# Patient Record
Sex: MALE | Race: WHITE | NOT HISPANIC OR LATINO | Employment: FULL TIME | URBAN - METROPOLITAN AREA
[De-identification: names, ages, dates, MRNs, and addresses within clinical notes are randomized per-mention and may not be internally consistent; named-entity substitution may affect disease eponyms.]

---

## 2024-07-03 ENCOUNTER — OFFICE VISIT (OUTPATIENT)
Dept: URGENT CARE | Facility: CLINIC | Age: 39
End: 2024-07-03
Payer: COMMERCIAL

## 2024-07-03 VITALS
RESPIRATION RATE: 20 BRPM | DIASTOLIC BLOOD PRESSURE: 86 MMHG | HEIGHT: 70 IN | SYSTOLIC BLOOD PRESSURE: 130 MMHG | WEIGHT: 195 LBS | BODY MASS INDEX: 27.92 KG/M2 | TEMPERATURE: 98 F | OXYGEN SATURATION: 99 % | HEART RATE: 84 BPM

## 2024-07-03 DIAGNOSIS — S70.362A TICK BITE OF LEFT THIGH, INITIAL ENCOUNTER: Primary | ICD-10-CM

## 2024-07-03 DIAGNOSIS — Z76.89 ENCOUNTER TO ESTABLISH CARE: ICD-10-CM
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
## 2024-07-03 RX ORDER — DULOXETIN HYDROCHLORIDE 30 MG/1
30 CAPSULE, DELAYED RELEASE ORAL
COMMUNITY
Start: 2024-04-25

## 2024-07-03 RX ORDER — GABAPENTIN 100 MG/1
100 CAPSULE ORAL
COMMUNITY
Start: 2024-04-15

## 2024-07-03 RX ORDER — DOXYCYCLINE 100 MG/1
100 CAPSULE ORAL 2 TIMES DAILY
Qty: 20 CAPSULE | Refills: 0 | Status: SHIPPED | OUTPATIENT
Start: 2024-07-03 | End: 2024-07-13

## 2024-07-03 RX ORDER — PRAZOSIN HYDROCHLORIDE 1 MG/1
1 CAPSULE ORAL NIGHTLY
COMMUNITY
Start: 2024-04-15

## 2024-07-03 RX ORDER — LAMOTRIGINE 100 MG/1
100 TABLET ORAL
COMMUNITY
Start: 2024-05-26

## 2024-07-03 RX ORDER — NALTREXONE HYDROCHLORIDE 50 MG/1
50 TABLET, FILM COATED ORAL
COMMUNITY
Start: 2024-05-06

## 2024-07-03 NOTE — PROGRESS NOTES
"Subjective:      Patient ID: Chilo Allen is a 39 y.o. male.    Vitals:  height is 5' 10" (1.778 m) and weight is 88.5 kg (195 lb). His temporal temperature is 98.2 °F (36.8 °C). His blood pressure is 130/86 and his pulse is 84. His respiration is 20 and oxygen saturation is 99%.     Chief Complaint: Rash (Have a tick bite that is growing and sore - Entered by patient)    Pt visited [place] 2 weeks ago. He noticed the tick to left inner thigh 1 week ago and removed it.  Patient was showering and thought it was a scabbed.  When pulling on the scab he realize it was a tick. Warm too touch, swelling, painful, and spreading as of 1 week.     Rash  This is a new problem. The current episode started in the past 7 days. The problem has been gradually worsening since onset. The affected locations include the left upper leg. The rash is characterized by pain, redness, swelling and bruising. He was exposed to an insect bite/sting. Pertinent negatives include no fatigue, fever or shortness of breath. Past treatments include nothing.       Constitution: Negative for chills, fatigue and fever.   Respiratory:  Negative for shortness of breath.    Skin:  Positive for rash and erythema.      Objective:     Physical Exam   Constitutional: He is oriented to person, place, and time. He appears well-developed.   HENT:   Head: Normocephalic and atraumatic. Head is without abrasion, without contusion and without laceration.   Ears:   Right Ear: External ear normal.   Left Ear: External ear normal.   Nose: Nose normal.   Mouth/Throat: Oropharynx is clear and moist and mucous membranes are normal.   Eyes: Conjunctivae, EOM and lids are normal. Pupils are equal, round, and reactive to light.   Neck: Trachea normal and phonation normal. Neck supple.   Cardiovascular: Normal rate, regular rhythm and normal heart sounds.   Pulmonary/Chest: Effort normal and breath sounds normal. No stridor. No respiratory distress. "   Musculoskeletal: Normal range of motion.         General: Normal range of motion.   Neurological: He is alert and oriented to person, place, and time.   Skin: Skin is warm, dry, intact and no rash. Capillary refill takes less than 2 seconds. erythema No abrasion, No burn, No bruising and No ecchymosis        Psychiatric: His speech is normal and behavior is normal. Judgment and thought content normal.   Nursing note and vitals reviewed.      Assessment:     1. Tick bite of left thigh, initial encounter    2. Encounter to establish care        Plan:       Tick bite of left thigh, initial encounter  -     doxycycline (VIBRAMYCIN) 100 MG Cap; Take 1 capsule (100 mg total) by mouth 2 (two) times daily. for 10 days  Dispense: 20 capsule; Refill: 0    Encounter to establish care  -     Ambulatory referral/consult to Family Practice            Discussed results/diagnosis/plan with patient in clinic. Strict precautions given to patient to monitor for worsening signs and symptoms. Advised to follow up with PCP or specialist.  Explained side effects of medications prescribed with patient and informed him/her to discontinue use if he/she has any side effects and to inform UC or PCP if this occurs. All questions answered. Strict ED verses clinic return precautions stressed and given in depth. Advised if symptoms worsens of fail to improve he/she should go to the Emergency Room. Discharge and follow-up instructions given verbally/printed with the patient who expressed understanding and willingness to comply with my recommendations. Patient voiced understanding and in agreement with current treatment plan. Patient exits the exam room in no acute distress. Conversant and engaged during discharge discussion, verbalized understanding.

## 2024-07-03 NOTE — PATIENT INSTRUCTIONS
Take oral antibiotic (doxycycline) as directed for the next 10 days.  Take full dose or symptoms will not get better. Take with food and water to decrease GI upset. Try a probiotic or eat daily yogurt to maintain good gut and vaginal piyush while on an antibiotic. Do not drink alcohol while taking an antibiotic.      Alternate tylenol and ibuprofen every 4 hours as needed for pain. Always take ibuprofen with food and water to avoid GI upset. Stop taking if you develop abdominal pain or blood in stool.     Try an oral anti-histamine like zyrtec during the day for itching. Over the counter Pepcid twice per day can be helpful (helps decrease inflammation).  You can try benadryl at night for itching. This will make you drowsy. Make sure not to drive, drink alcohol, operate machinery or take other sedating medications while taking this.      Apply ice packs or wet cloths to the rash to reduce itching. Keep cool and stay out of the sun. Leave rash open to the air as much as you can. Avoid using fragrant soaps as this can cause irritation. Only use gentle soaps. Stay moisturized.     Drink plenty of fluids daily (water is best).   The recommended daily fluid intake for men is 3.7 liters (seven 16 oz bottles).    Cleanse wound once per day with gentle soap and water. Do not use peroxide to cleanse as this will disrupt the natural skin healing process.     Monitor for worsening signs of infection such as redness, warmth, increase in pain, purulent drainage, fevers, chills, body aches.     Eat nutritious foods high in antioxidants that include fruits and vegetables. Try a Mediterranean diet to decrease inflammation and will help boost your immune system.     Getting enough sleep at night (7-8 hours), eating nutritious foods, managing stress levels and maintaining physical activity (3 days a week of 45 min of aerobic activity) can all help your immune system.    Follow up with a PCP next week. A referral was placed for you,  call [phone] to schedule appointment.          ([facility] recommendation)  The Mediterranean diet is based on plant-based foods and healthy fats.     You eat LOTS of vegetables, fruits, beans, lentils, nuts, whole grains (wheat bread, brown rice) & extra virgin olive oil.   ---- These foods are high in fiber and antioxidants.   ---- These nutrients help reduce inflammation.   ---- Fiber helps regulate bowel movements.   ---- Antioxidants protect you against cancer.     Eat a moderate amount of fish (salmon, herring, mackerel, sardines, anchovies, halibut, rainbow trout, tuna)   ---- (fish are high in omega-3 fatty acids)    Eat a moderate amount of cheese and yogurt.    Eat little or no meat-- eat more poultry (baked chicken, grilled chicken, ground turkey)  ---- avoid red meat and pork (causes inflammation and linked to colon cancer)    Eat little or no sweats, sugary drinks or butter.     Limit your sodium intake. A diet high in salt can increase your blood pressure and predisposes you to a heart attack or stroke.     BENEFITS OF DIET  --- Lowers your risk of cardiovascular disease (heart attack, stroke) and many other diseases.   --- Supports a healthy body weight.   --- Supports a healthy blood sugar, blood pressure and cholesterol.   --- Lowers your risk of metabolic syndrome   --- Supports a heathy balance of good gut bacteria in your digestive system.   --- Lowers your risk for cancer.   --- Helps with brain function and slows decline as we age.   --- Helps you live longer.     Talk to your primary care doctor about a dietician referral for further guidance.

## 2024-07-15 ENCOUNTER — OFFICE VISIT (OUTPATIENT)
Dept: PRIMARY CARE CLINIC | Facility: CLINIC | Age: 39
End: 2024-07-15
Payer: COMMERCIAL

## 2024-07-15 VITALS
HEART RATE: 75 BPM | WEIGHT: 196.19 LBS | DIASTOLIC BLOOD PRESSURE: 76 MMHG | TEMPERATURE: 98 F | RESPIRATION RATE: 16 BRPM | OXYGEN SATURATION: 98 % | HEIGHT: 70 IN | SYSTOLIC BLOOD PRESSURE: 130 MMHG | BODY MASS INDEX: 28.09 KG/M2

## 2024-07-15 DIAGNOSIS — Z76.89 ENCOUNTER TO ESTABLISH CARE: Primary | ICD-10-CM

## 2024-07-15 DIAGNOSIS — K21.9 GASTROESOPHAGEAL REFLUX DISEASE, UNSPECIFIED WHETHER ESOPHAGITIS PRESENT: ICD-10-CM

## 2024-07-15 DIAGNOSIS — Z11.59 NEED FOR HEPATITIS C SCREENING TEST: ICD-10-CM

## 2024-07-15 DIAGNOSIS — K64.8 INTERNAL HEMORRHOID: ICD-10-CM

## 2024-07-15 DIAGNOSIS — Z79.899 OTHER LONG TERM (CURRENT) DRUG THERAPY: ICD-10-CM

## 2024-07-15 DIAGNOSIS — Z29.81 ENCOUNTER FOR PRE-EXPOSURE PROPHYLAXIS FOR HIV: ICD-10-CM

## 2024-07-15 DIAGNOSIS — Z72.0 TOBACCO ABUSE: ICD-10-CM

## 2024-07-15 DIAGNOSIS — Z78.9 ALCOHOL USE: ICD-10-CM

## 2024-07-15 DIAGNOSIS — Z11.4 ENCOUNTER FOR SCREENING FOR HIV: ICD-10-CM

## 2024-07-15 DIAGNOSIS — F31.81 BIPOLAR 2 DISORDER: ICD-10-CM

## 2024-07-15 PROCEDURE — 93005 ELECTROCARDIOGRAM TRACING: CPT | Mod: S$GLB,,, | Performed by: STUDENT IN AN ORGANIZED HEALTH CARE EDUCATION/TRAINING PROGRAM

## 2024-07-15 PROCEDURE — 99999 PR PBB SHADOW E&M-EST. PATIENT-LVL IV: CPT | Mod: PBBFAC,,, | Performed by: STUDENT IN AN ORGANIZED HEALTH CARE EDUCATION/TRAINING PROGRAM

## 2024-07-15 PROCEDURE — 99204 OFFICE O/P NEW MOD 45 MIN: CPT | Mod: S$GLB,,, | Performed by: STUDENT IN AN ORGANIZED HEALTH CARE EDUCATION/TRAINING PROGRAM

## 2024-07-15 PROCEDURE — 3078F DIAST BP <80 MM HG: CPT | Mod: CPTII,S$GLB,, | Performed by: STUDENT IN AN ORGANIZED HEALTH CARE EDUCATION/TRAINING PROGRAM

## 2024-07-15 PROCEDURE — 1159F MED LIST DOCD IN RCRD: CPT | Mod: CPTII,S$GLB,, | Performed by: STUDENT IN AN ORGANIZED HEALTH CARE EDUCATION/TRAINING PROGRAM

## 2024-07-15 PROCEDURE — 1160F RVW MEDS BY RX/DR IN RCRD: CPT | Mod: CPTII,S$GLB,, | Performed by: STUDENT IN AN ORGANIZED HEALTH CARE EDUCATION/TRAINING PROGRAM

## 2024-07-15 PROCEDURE — 3075F SYST BP GE 130 - 139MM HG: CPT | Mod: CPTII,S$GLB,, | Performed by: STUDENT IN AN ORGANIZED HEALTH CARE EDUCATION/TRAINING PROGRAM

## 2024-07-15 PROCEDURE — 3008F BODY MASS INDEX DOCD: CPT | Mod: CPTII,S$GLB,, | Performed by: STUDENT IN AN ORGANIZED HEALTH CARE EDUCATION/TRAINING PROGRAM

## 2024-07-15 PROCEDURE — 93010 ELECTROCARDIOGRAM REPORT: CPT | Mod: S$GLB,,, | Performed by: INTERNAL MEDICINE

## 2024-07-15 RX ORDER — HYDROCORTISONE ACETATE 25 MG/1
25 SUPPOSITORY RECTAL 2 TIMES DAILY PRN
Qty: 24 SUPPOSITORY | Refills: 0 | Status: SHIPPED | OUTPATIENT
Start: 2024-07-15 | End: 2024-07-27

## 2024-07-15 NOTE — PATIENT INSTRUCTIONS
Encounter to establish care  -  CMP, lipid, A1c, TSH and EKG.   -     Bipolar 2 disorder  - CBC, CMP, TSH.  -     EKG 12-lead   - follows w/ psych in town.   - continue with current cares.    Gastroesophageal reflux disease, unspecified whether esophagitis present   - watching diet.  Continue with dietary intervention and adjustments as needed.  If worsening would recommend starting on H2 blocker or PPI daily.    Tobacco abuse  -     EKG 12-lead   - patient interested in smoking cessation, has been working on this previously.  Encouraged to continue endeavor in this direction as cigarette smoking or vaping are detrimental to overall health.    Alcohol use   - has been drinking more heavily since moving to [facility] states is part of his social seen more so than it had been in [facility].   - will check liver function, would benefit from trying to limit consumption closer toward the recommended 2 drinks per day or less on average.    Internal hemorrhoid  -     hydrocortisone (ANUSOL-HC) 25 mg suppository; Place 1 suppository (25 mg total) rectally 2 (two) times daily as needed for Hemorrhoids.  Dispense: 24 suppository; Refill: 0   - having flared internal hemorrhoid at this time.   - had become worse after use of doxycycline.   - discuss use of preparation H, or other OTC meds.  Due to worsening symptom will prescribe Anusol suppositories, can use up to 2 times per day 2 weeks.   - would use caution with any receptive intercourse, avoid if possible.  Use a good lubricant otherwise.     Encounter for pre-exposure prophylaxis for HIV   - patient has previously been on PrEP, would like to resume therapy.  Referral placed to ID clinic.    Need for hepatitis C screening test  -     Hepatitis C Antibody; Future; Expected date: 07/15/2024    Encounter for screening for HIV  -     Ambulatory referral/consult to Infectious Disease; Future; Expected date: 07/22/2024  -     HIV 1/2 Ag/Ab (4th Gen); Future; Expected  date: 07/15/2024    Other long term (current) drug therapy  -  CMP, lipid, A1c, TSH and EKG.   -

## 2024-07-15 NOTE — PROGRESS NOTES
Subjective:           Patient ID: Chilo Allen   Age:  39 y.o.  Sex: male     Chief Complaint:   Establish Care and Tick Removal (Tick bite)      History of Present Illness:    Chilo Allen is a 39 y.o. male who presents today with a chief complaint of Establish Care and Tick Removal (Tick bite)  .    40yo male presenting for est care.  Moved to [facility] from [place] about 8 months ago.  Was in [place] 4 weeks ago and was bit by a tick on the left thigh.  Swelled up, got purple color.  Was seen in urgent care and given Doxycycline 100mg BID x 10 days about 2 weeks after initial bite.    State that lesion is much better at this time.  Is red and peeling.   Is not painful or itching.     Has a psychiatrist in [place], taking Cymbalta 30mg daily, Lamotrigine 100mg daily   Dx of Bipolar 2.  Lamictal to mood stabilize, Cymbalta for depression.  Gabapentin as needed, is not using frequently.     Prazosin is being used for nightmares.  Has been helpful.     Was using Naptrexone to help stopping smoking, took for a week - then stopped while traveling.     Has had increased diarrhea since moving to [facility].    Hx of hemorrhoid, is having some inflammation and pain at this time.       Started drinking more since moving to [place].   Has been trying to limit this as was not that frequent before.     Is interested in getting on PreP.  Has been on it before.         Review of Systems   Constitutional: Negative.  Negative for fatigue and fever.   HENT: Negative.  Negative for congestion, postnasal drip, sinus pressure and sinus pain.    Eyes: Negative.    Respiratory: Negative.  Negative for cough, shortness of breath and wheezing.    Cardiovascular: Negative.  Negative for chest pain, palpitations and leg swelling.   Gastrointestinal:  Positive for abdominal pain, anal bleeding (rectal discomfort w/ prolapsed internal hemorrhoid) and diarrhea. Negative for constipation, nausea and vomiting.   Endocrine:  "Negative.    Genitourinary: Negative.  Negative for difficulty urinating, frequency and urgency.   Musculoskeletal: Negative.  Negative for back pain and joint swelling.   Skin: Negative.    Allergic/Immunologic: Negative for food allergies.   Neurological:  Negative for headaches.   Psychiatric/Behavioral:  Positive for dysphoric mood. Negative for sleep disturbance. The patient is nervous/anxious.            Objective:        Vitals:    07/15/24 1323   BP: 130/76   BP Location: Right arm   Patient Position: Sitting   BP Method: Medium (Manual)   Pulse: 75   Resp: 16   Temp: 98.2 °F (36.8 °C)   TempSrc: Oral   SpO2: 98%   Weight: 89 kg (196 lb 3.4 oz)   Height: 5' 10" (1.778 m)       Body mass index is 28.15 kg/m².      Physical Exam  Vitals reviewed.   Constitutional:       Appearance: Normal appearance. He is not toxic-appearing.      Comments: As per BMI.   HENT:      Head: Normocephalic and atraumatic.      Right Ear: External ear normal.      Left Ear: External ear normal.      Nose: No congestion.      Mouth/Throat:      Mouth: Mucous membranes are moist.      Pharynx: Oropharynx is clear.   Eyes:      Extraocular Movements: Extraocular movements intact.      Conjunctiva/sclera: Conjunctivae normal.   Cardiovascular:      Rate and Rhythm: Normal rate and regular rhythm.      Pulses: Normal pulses.      Heart sounds: No murmur heard.  Pulmonary:      Effort: Pulmonary effort is normal. No respiratory distress.      Breath sounds: No wheezing.   Abdominal:      General: Bowel sounds are normal.      Palpations: Abdomen is soft.      Tenderness: There is no right CVA tenderness or left CVA tenderness.   Musculoskeletal:         General: No swelling.      Cervical back: Normal range of motion.   Lymphadenopathy:      Cervical: No cervical adenopathy.   Skin:     General: Skin is warm.      Capillary Refill: Capillary refill takes less than 2 seconds.      Coloration: Skin is not jaundiced.   Neurological:      " "General: No focal deficit present.      Mental Status: He is alert and oriented to person, place, and time.      Motor: No weakness.   Psychiatric:         Mood and Affect: Mood normal.         Thought Content: Thought content normal.           Past Medical History:   Diagnosis Date    Anxiety     Bipolar disorder        No results found for: "NA", "K", "CL", "CO2", "BUN", "CREATININE", "GLUCOSE", "ANIONGAP"  No results found for: "HGBA1C"  No results found for: "BNP", "BNPTRIAGEBLO"    No results found for: "WBC", "HGB", "HCT", "PLT", "GRAN"  No results found for: "CHOL", "HDL", "LDLCALC", "TRIG"     Outpatient Encounter Medications as of 7/15/2024   Medication Sig Dispense Refill    DULoxetine (CYMBALTA) 30 MG capsule Take 30 mg by mouth.      gabapentin (NEURONTIN) 100 MG capsule Take 100 mg by mouth daily as needed.      lamoTRIgine (LAMICTAL) 100 MG tablet Take 100 mg by mouth once daily.      prazosin (MINIPRESS) 1 MG Cap Take 1 mg by mouth every evening.      [] doxycycline (VIBRAMYCIN) 100 MG Cap Take 1 capsule (100 mg total) by mouth 2 (two) times daily. for 10 days 20 capsule 0    hydrocortisone (ANUSOL-HC) 25 mg suppository Place 1 suppository (25 mg total) rectally 2 (two) times daily as needed for Hemorrhoids. 24 suppository 0    naltrexone (DEPADE) 50 mg tablet Take 50 mg by mouth. (Patient not taking: Reported on 7/15/2024)       No facility-administered encounter medications on file as of 7/15/2024.          Assessment:       1. Encounter to establish care    2. Bipolar 2 disorder    3. Gastroesophageal reflux disease, unspecified whether esophagitis present    4. Tobacco abuse    5. Alcohol use    6. Internal hemorrhoid    7. Need for hepatitis C screening test    8. Encounter for screening for HIV    9. Other long term (current) drug therapy    10. Encounter for pre-exposure prophylaxis for HIV           Plan:         Encounter to establish care  -  CMP, lipid, A1c, TSH and EKG.   - "     Bipolar 2 disorder  - CBC, CMP, TSH.  -     EKG 12-lead   - follows w/ psych in town.   - continue with current cares.    Gastroesophageal reflux disease, unspecified whether esophagitis present   - watching diet.  Continue with dietary intervention and adjustments as needed.  If worsening would recommend starting on H2 blocker or PPI daily.    Tobacco abuse  -     EKG 12-lead   - patient interested in smoking cessation, has been working on this previously.  Encouraged to continue endeavor in this direction as cigarette smoking or vaping are detrimental to overall health.    Alcohol use   - has been drinking more heavily since moving to [facility] states is part of his social seen more so than it had been in [facility].   - will check liver function, would benefit from trying to limit consumption closer toward the recommended 2 drinks per day or less on average.    Internal hemorrhoid  -     hydrocortisone (ANUSOL-HC) 25 mg suppository; Place 1 suppository (25 mg total) rectally 2 (two) times daily as needed for Hemorrhoids.  Dispense: 24 suppository; Refill: 0   - having flared internal hemorrhoid at this time.   - had become worse after use of doxycycline.   - discuss use of preparation H, or other OTC meds.  Due to worsening symptom will prescribe Anusol suppositories, can use up to 2 times per day 2 weeks.   - would use caution with any receptive intercourse, avoid if possible.  Use a good lubricant otherwise.     Encounter for pre-exposure prophylaxis for HIV   - patient has previously been on PrEP, would like to resume therapy.  Referral placed to ID clinic.    Need for hepatitis C screening test  -     Hepatitis C Antibody; Future; Expected date: 07/15/2024    Encounter for screening for HIV  -     Ambulatory referral/consult to Infectious Disease; Future; Expected date: 07/22/2024  -     HIV 1/2 Ag/Ab (4th Gen); Future; Expected date: 07/15/2024    Other long term (current) drug therapy  -  CMP,  lipid, A1c, TSH and EKG.   -

## 2024-07-16 LAB
OHS QRS DURATION: 114 MS
OHS QTC CALCULATION: 407 MS